# Patient Record
Sex: FEMALE | Race: WHITE | Employment: FULL TIME | URBAN - METROPOLITAN AREA
[De-identification: names, ages, dates, MRNs, and addresses within clinical notes are randomized per-mention and may not be internally consistent; named-entity substitution may affect disease eponyms.]

---

## 2021-01-29 ENCOUNTER — HOSPITAL ENCOUNTER (EMERGENCY)
Age: 61
Discharge: HOME | End: 2021-01-30
Attending: EMERGENCY MEDICINE
Payer: COMMERCIAL

## 2021-01-29 ENCOUNTER — APPOINTMENT (OUTPATIENT)
Dept: GENERAL RADIOLOGY | Age: 61
End: 2021-01-29
Attending: EMERGENCY MEDICINE
Payer: COMMERCIAL

## 2021-01-29 ENCOUNTER — APPOINTMENT (OUTPATIENT)
Dept: CT IMAGING | Age: 61
End: 2021-01-29
Attending: EMERGENCY MEDICINE
Payer: COMMERCIAL

## 2021-01-29 DIAGNOSIS — L03.116 CELLULITIS OF LEFT LOWER EXTREMITY: Primary | ICD-10-CM

## 2021-01-29 DIAGNOSIS — L02.416 CUTANEOUS ABSCESS OF LEFT LOWER EXTREMITY: ICD-10-CM

## 2021-01-29 LAB
ALBUMIN SERPL-MCNC: 3.1 G/DL (ref 3.4–5)
ALBUMIN/GLOB SERPL: 0.7 {RATIO} (ref 0.8–1.7)
ALP SERPL-CCNC: 77 U/L (ref 45–117)
ALT SERPL-CCNC: 22 U/L (ref 13–56)
ANION GAP SERPL CALC-SCNC: 4 MMOL/L (ref 3–18)
APPEARANCE UR: ABNORMAL
AST SERPL-CCNC: 12 U/L (ref 10–38)
BACTERIA URNS QL MICRO: ABNORMAL /HPF
BASOPHILS # BLD: 0 K/UL (ref 0–0.1)
BASOPHILS NFR BLD: 0 % (ref 0–3)
BILIRUB SERPL-MCNC: 0.4 MG/DL (ref 0.2–1)
BILIRUB UR QL: NEGATIVE
BUN SERPL-MCNC: 16 MG/DL (ref 7–18)
BUN/CREAT SERPL: 20 (ref 12–20)
CALCIUM SERPL-MCNC: 9.3 MG/DL (ref 8.5–10.1)
CHLORIDE SERPL-SCNC: 95 MMOL/L (ref 100–111)
CO2 SERPL-SCNC: 34 MMOL/L (ref 21–32)
COLOR UR: YELLOW
CREAT SERPL-MCNC: 0.82 MG/DL (ref 0.6–1.3)
DIFFERENTIAL METHOD BLD: ABNORMAL
EOSINOPHIL # BLD: 0 K/UL (ref 0–0.4)
EOSINOPHIL NFR BLD: 0 % (ref 0–5)
EPITH CASTS URNS QL MICRO: ABNORMAL /LPF (ref 0–5)
ERYTHROCYTE [DISTWIDTH] IN BLOOD BY AUTOMATED COUNT: 14.8 % (ref 11.6–14.5)
GLOBULIN SER CALC-MCNC: 4.2 G/DL (ref 2–4)
GLUCOSE SERPL-MCNC: 211 MG/DL (ref 74–99)
GLUCOSE UR STRIP.AUTO-MCNC: >1000 MG/DL
HCT VFR BLD AUTO: 39 % (ref 35–45)
HGB BLD-MCNC: 12.4 G/DL (ref 12–16)
HGB UR QL STRIP: NEGATIVE
KETONES UR QL STRIP.AUTO: 15 MG/DL
LACTATE BLD-SCNC: 1.6 MMOL/L (ref 0.4–2)
LEUKOCYTE ESTERASE UR QL STRIP.AUTO: ABNORMAL
LYMPHOCYTES # BLD: 0.9 K/UL (ref 0.8–3.5)
LYMPHOCYTES NFR BLD: 5 % (ref 20–51)
MCH RBC QN AUTO: 28.9 PG (ref 24–34)
MCHC RBC AUTO-ENTMCNC: 31.8 G/DL (ref 31–37)
MCV RBC AUTO: 90.9 FL (ref 74–97)
MONOCYTES # BLD: 2.3 K/UL (ref 0–1)
MONOCYTES NFR BLD: 13 % (ref 2–9)
NEUTS BAND NFR BLD MANUAL: 2 % (ref 0–5)
NEUTS SEG # BLD: 14.4 K/UL (ref 1.8–8)
NEUTS SEG NFR BLD: 80 % (ref 42–75)
NITRITE UR QL STRIP.AUTO: NEGATIVE
PH UR STRIP: 5 [PH] (ref 5–8)
PLATELET # BLD AUTO: 278 K/UL (ref 135–420)
PMV BLD AUTO: 9.7 FL (ref 9.2–11.8)
POTASSIUM SERPL-SCNC: 4 MMOL/L (ref 3.5–5.5)
PROT SERPL-MCNC: 7.3 G/DL (ref 6.4–8.2)
PROT UR STRIP-MCNC: 30 MG/DL
RBC # BLD AUTO: 4.29 M/UL (ref 4.2–5.3)
RBC #/AREA URNS HPF: ABNORMAL /HPF (ref 0–5)
RBC MORPH BLD: ABNORMAL
SODIUM SERPL-SCNC: 133 MMOL/L (ref 136–145)
SP GR UR REFRACTOMETRY: >1.03 (ref 1–1.03)
UROBILINOGEN UR QL STRIP.AUTO: 0.2 EU/DL (ref 0.2–1)
WBC # BLD AUTO: 18 K/UL (ref 4.6–13.2)
WBC URNS QL MICRO: ABNORMAL /HPF (ref 0–5)

## 2021-01-29 PROCEDURE — 74011250636 HC RX REV CODE- 250/636: Performed by: EMERGENCY MEDICINE

## 2021-01-29 PROCEDURE — 83605 ASSAY OF LACTIC ACID: CPT

## 2021-01-29 PROCEDURE — 96366 THER/PROPH/DIAG IV INF ADDON: CPT

## 2021-01-29 PROCEDURE — 80053 COMPREHEN METABOLIC PANEL: CPT

## 2021-01-29 PROCEDURE — 71045 X-RAY EXAM CHEST 1 VIEW: CPT

## 2021-01-29 PROCEDURE — 74177 CT ABD & PELVIS W/CONTRAST: CPT

## 2021-01-29 PROCEDURE — 87040 BLOOD CULTURE FOR BACTERIA: CPT

## 2021-01-29 PROCEDURE — 96367 TX/PROPH/DG ADDL SEQ IV INF: CPT

## 2021-01-29 PROCEDURE — 96365 THER/PROPH/DIAG IV INF INIT: CPT

## 2021-01-29 PROCEDURE — 96375 TX/PRO/DX INJ NEW DRUG ADDON: CPT

## 2021-01-29 PROCEDURE — 93005 ELECTROCARDIOGRAM TRACING: CPT

## 2021-01-29 PROCEDURE — 85025 COMPLETE CBC W/AUTO DIFF WBC: CPT

## 2021-01-29 PROCEDURE — 74011000258 HC RX REV CODE- 258: Performed by: EMERGENCY MEDICINE

## 2021-01-29 PROCEDURE — 99285 EMERGENCY DEPT VISIT HI MDM: CPT

## 2021-01-29 PROCEDURE — 81001 URINALYSIS AUTO W/SCOPE: CPT

## 2021-01-29 PROCEDURE — 74011000636 HC RX REV CODE- 636: Performed by: EMERGENCY MEDICINE

## 2021-01-29 RX ORDER — LOSARTAN POTASSIUM AND HYDROCHLOROTHIAZIDE 25; 100 MG/1; MG/1
1 TABLET ORAL DAILY
COMMUNITY

## 2021-01-29 RX ORDER — CLINDAMYCIN PHOSPHATE 600 MG/50ML
600 INJECTION, SOLUTION INTRAVENOUS EVERY 8 HOURS
Status: DISCONTINUED | OUTPATIENT
Start: 2021-01-29 | End: 2021-01-30 | Stop reason: HOSPADM

## 2021-01-29 RX ORDER — DOXYCYCLINE HYCLATE 100 MG
100 TABLET ORAL 2 TIMES DAILY
Qty: 14 TAB | Refills: 0 | Status: SHIPPED | OUTPATIENT
Start: 2021-01-29 | End: 2021-02-05

## 2021-01-29 RX ORDER — AMLODIPINE BESYLATE 5 MG/1
5 TABLET ORAL DAILY
COMMUNITY

## 2021-01-29 RX ORDER — AMOXICILLIN AND CLAVULANATE POTASSIUM 875; 125 MG/1; MG/1
1 TABLET, FILM COATED ORAL 2 TIMES DAILY
Qty: 20 TAB | Refills: 0 | Status: SHIPPED | OUTPATIENT
Start: 2021-01-29 | End: 2021-02-08

## 2021-01-29 RX ORDER — VANCOMYCIN 2 GRAM/500 ML IN 0.9 % SODIUM CHLORIDE INTRAVENOUS
2000 ONCE
Status: COMPLETED | OUTPATIENT
Start: 2021-01-29 | End: 2021-01-30

## 2021-01-29 RX ORDER — VANCOMYCIN 2 GRAM/500 ML IN 0.9 % SODIUM CHLORIDE INTRAVENOUS
2000 EVERY 12 HOURS
Status: DISCONTINUED | OUTPATIENT
Start: 2021-01-30 | End: 2021-01-30

## 2021-01-29 RX ORDER — SODIUM CHLORIDE 0.9 % (FLUSH) 0.9 %
5-10 SYRINGE (ML) INJECTION AS NEEDED
Status: DISCONTINUED | OUTPATIENT
Start: 2021-01-29 | End: 2021-01-30 | Stop reason: HOSPADM

## 2021-01-29 RX ORDER — METFORMIN HYDROCHLORIDE 500 MG/1
500 TABLET, FILM COATED, EXTENDED RELEASE ORAL
COMMUNITY

## 2021-01-29 RX ORDER — FENOFIBRATE 160 MG/1
160 TABLET ORAL DAILY
COMMUNITY

## 2021-01-29 RX ORDER — ATORVASTATIN CALCIUM 20 MG/1
20 TABLET, FILM COATED ORAL DAILY
COMMUNITY

## 2021-01-29 RX ADMIN — CLINDAMYCIN PHOSPHATE 600 MG: 600 INJECTION, SOLUTION INTRAVENOUS at 20:39

## 2021-01-29 RX ADMIN — VANCOMYCIN HYDROCHLORIDE 2000 MG: 10 INJECTION, POWDER, LYOPHILIZED, FOR SOLUTION INTRAVENOUS at 22:22

## 2021-01-29 RX ADMIN — IOPAMIDOL 100 ML: 612 INJECTION, SOLUTION INTRAVENOUS at 21:11

## 2021-01-29 RX ADMIN — PIPERACILLIN AND TAZOBACTAM 4.5 G: 4; .5 INJECTION, POWDER, LYOPHILIZED, FOR SOLUTION INTRAVENOUS; PARENTERAL at 20:57

## 2021-01-30 VITALS
BODY MASS INDEX: 45.99 KG/M2 | DIASTOLIC BLOOD PRESSURE: 44 MMHG | WEIGHT: 293 LBS | SYSTOLIC BLOOD PRESSURE: 134 MMHG | OXYGEN SATURATION: 92 % | RESPIRATION RATE: 18 BRPM | HEART RATE: 103 BPM | HEIGHT: 67 IN | TEMPERATURE: 98.7 F

## 2021-01-30 LAB
ATRIAL RATE: 90 BPM
CALCULATED P AXIS, ECG09: 41 DEGREES
CALCULATED R AXIS, ECG10: 17 DEGREES
CALCULATED T AXIS, ECG11: 22 DEGREES
DIAGNOSIS, 93000: NORMAL
P-R INTERVAL, ECG05: 146 MS
Q-T INTERVAL, ECG07: 348 MS
QRS DURATION, ECG06: 92 MS
QTC CALCULATION (BEZET), ECG08: 425 MS
VENTRICULAR RATE, ECG03: 90 BPM

## 2021-01-30 PROCEDURE — 96366 THER/PROPH/DIAG IV INF ADDON: CPT

## 2021-01-30 NOTE — PROGRESS NOTES
Vancomycin - Pharmacy to Dose    Consult provided for this 56y.o. year old ,female for indication of Skin and Soft Tissue Infection. Therapy day 1        Wt Readings from Last 1 Encounters:   01/29/21 157.4 kg (347 lb)       Ht Readings from Last 1 Encounters:   01/29/21 170.2 cm (67\")     Dosing Weight:157.4 kg     Additional Antibiotics:  Zosyn, Clindamycin    Date:  1/29/21   Lab Results   Component Value Date/Time    Creatinine 0.81 04/24/2015 06:00 PM     creatinine clearance:  Pending    white blood cell count:  Pending    Will dose initially with Vancomycin 2000 mg IV x 1     Further dosing based on S. Creat results. Dose calculated to approximate a therapeutic trough of 15-20 mcg/mL. Pharmacy to follow daily and will make changes to dose and/or frequency based on clinical status.     [address] [place] [address], [address] [place] [address]

## 2021-01-30 NOTE — PROGRESS NOTES
Vancomycin - Pharmacy to Dose ( Skin and Soft Tissue Infection )  CrCl >100 ml/min ( S. Creat 0.82 1/29/21 )  Will continue with Vancomycin 2000 mg IV q12hrs. BMP x 3 ordered. Pharmacy will continue to follow and adjust for goal trough of 15-20 mcg/ml. Jong Gibbs  [phone]

## 2021-01-30 NOTE — ED NOTES
Pt told where to   prescribed medications. Pt given verbal and written d/c instructions.  pt ambulated from ED in NAD

## 2021-01-30 NOTE — ED TRIAGE NOTES
Patient reports having wound to left upper left for several weeks. Patient reports the wound ruptured two days ago, leaking purulent drainage.  Patient was seen [facility] and advised to come to ED for further treatment

## 2021-01-30 NOTE — ED PROVIDER NOTES
EMERGENCY DEPARTMENT HISTORY AND PHYSICAL EXAM    Date: 1/29/2021  Patient Name: Jenna Nair    History of Presenting Illness     Chief Complaint   Patient presents with    Abscess         History Provided By: Patient    Additional History (Context):   Jenna Nair is a 56 y.o. female with PMHX diabetes presents to the emergency department via private vehicle C/O left-sided groin and thigh abscess that started draining yesterday. Patient reports that she noticed the wound, drainage and abscess 2 days ago. [facility] advised that the patient should come to the emergency department for further evaluation. States that at urgent care, her urinalysis was performed. No indication for UTI. Pt denies fever, nausea, vomiting, and any other sxs or complaints. No relieving or exacerbating factors identified. PCP: None    Current Facility-Administered Medications   Medication Dose Route Frequency Provider Last Rate Last Admin    sodium chloride (NS) flush 5-10 mL  5-10 mL IntraVENous PRN Michael Henriquez DO        clindamycin (CLEOCIN) 600mg NS 50 mL IVPB (premix)  600 mg IntraVENous Q8H Michael Henriquez DO   600 mg at 01/29/21 2039    piperacillin-tazobactam (ZOSYN) 4.5 g in 0.9% sodium chloride (MBP/ADV) 100 mL MBP  4.5 g IntraVENous Q6H Michael Henriquez  mL/hr at 01/29/21 2057 4.5 g at 01/29/21 2057    Vancomycin - Pharmacokinetic Dosing  1 Each Other Rx Dosing/Monitoring Michael Henriquez DO        vancomycin (VANCOCIN) 2000 mg in  ml infusion  2,000 mg IntraVENous ONCE Michael Henriquez DO        [START ON 1/30/2021] vancomycin (VANCOCIN) 2000 mg in  ml infusion  2,000 mg IntraVENous Q12H Michael Henriquez DO         Current Outpatient Medications   Medication Sig Dispense Refill    amLODIPine (NORVASC) 5 mg tablet Take 5 mg by mouth daily.       atorvastatin (LIPITOR) 20 mg tablet Take 20 mg by mouth daily.  dapagliflozin (Farxiga) 10 mg tab tablet Take 10 mg by mouth daily.  fenofibrate (LOFIBRA) 160 mg tablet Take 160 mg by mouth daily.  losartan-hydroCHLOROthiazide (HYZAAR) 100-25 mg per tablet Take 1 Tab by mouth daily.  metFORMIN (GLUMETZA ER) 500 mg TG24 24 hour tablet Take 500 mg by mouth.  icosapent ethyl (VASCEPA PO) Take 2 g by mouth two (2) times a day.  doxycycline (VIBRA-TABS) 100 mg tablet Take 1 Tab by mouth two (2) times a day for 7 days. 14 Tab 0    amoxicillin-clavulanate (Augmentin) 875-125 mg per tablet Take 1 Tab by mouth two (2) times a day for 10 days. 20 Tab 0    HYDROcodone-acetaminophen 5-500 mg cap Take  by mouth. Past History     Past Medical History:  Past Medical History:   Diagnosis Date    Diabetes ([facility]     Hypertension     Morbid obesity ([facility] [facility]        Past Surgical History:  Past Surgical History:   Procedure Laterality Date    HX GASTRIC BYPASS      HX HYSTERECTOMY      HX KNEE REPLACEMENT Bilateral     HX SHOULDER ARTHROSCOPY Right        Family History:  History reviewed. No pertinent family history. Social History:  Social History     Tobacco Use    Smoking status: Never Smoker    Smokeless tobacco: Never Used   Substance Use Topics    Alcohol use: No    Drug use: No       Allergies: Allergies   Allergen Reactions    Aspirin Other (comments)     Pt reports turning blue    Tetanus Vaccines And Toxoid Other (comments)     Pt reports near death experience as child         Review of Systems   Review of Systems   Constitutional: Negative for chills and fever. HENT: Negative for congestion, ear pain, sinus pain and sore throat. Eyes: Negative for pain and visual disturbance. Respiratory: Negative for cough and shortness of breath. Cardiovascular: Negative for chest pain and leg swelling. Gastrointestinal: Negative for abdominal pain, constipation, diarrhea, nausea and vomiting. Genitourinary: Negative for dysuria, hematuria, vaginal bleeding and vaginal discharge. Musculoskeletal: Negative for back pain and neck pain. Skin: Positive for wound. Negative for rash. Neurological: Negative for dizziness, tremors, weakness, light-headedness and numbness. All other systems reviewed and are negative. Physical Exam     Vitals:    01/29/21 1929 01/29/21 2117 01/29/21 2200   BP: (!) 152/63 (!) 155/59 (!) 144/48   Pulse: (!) 103     Resp: 18     Temp: 98.7 °F (37.1 °C)     SpO2: 99% 99% 94%   Weight: 157.4 kg (347 lb)     Height: 5' 7\" (1.702 m)       Physical Exam  Genitourinary:            Nursing note and vitals reviewed    Constitutional: Morbidly obese elderly  female, nontoxic  Head: Normocephalic, Atraumatic  Eyes: Pupils are equal, round, and reactive to light, EOMI  Neck: Supple, non-tender  Cardiovascular: Regular rate and rhythm, no murmurs, rubs, or gallops, + 2 radial pulses bilaterally  Chest: Normal work of breathing and chest excursion bilaterally  Lungs: Clear to ausculation bilaterally, no wheezes, no rhonchi  Abdomen: Soft, non tender, non distended, normoactive bowel sounds  : Extensive indurated erythema along the left groin extending along the perineum. There is a firm purulent wound that is approximately 2 cm in radius that is draining green purulence. Back: No evidence of trauma or deformity  Extremities: No evidence of trauma or deformity, no LE edema.  No streaking erythema, vesicular lesions, ulcerations or bulla  Skin: Warm and dry, normal cap refill  Neuro: Alert and appropriate, CN intact, normal speech, moving all 4 extremities freely and symmetrically  Psychiatric: Normal mood and affect       Diagnostic Study Results     Labs -     Recent Results (from the past 12 hour(s))   METABOLIC PANEL, COMPREHENSIVE    Collection Time: 01/29/21  8:25 PM   Result Value Ref Range    Sodium 133 (L) 136 - 145 mmol/L    Potassium 4.0 3.5 - 5.5 mmol/L Chloride 95 (L) 100 - 111 mmol/L    CO2 34 (H) 21 - 32 mmol/L    Anion gap 4 3.0 - 18 mmol/L    Glucose 211 (H) 74 - 99 mg/dL    BUN 16 7.0 - 18 MG/DL    Creatinine 0.82 0.6 - 1.3 MG/DL    BUN/Creatinine ratio 20 12 - 20      GFR est AA >60 >60 ml/min/1.73m2    GFR est non-AA >60 >60 ml/min/1.73m2    Calcium 9.3 8.5 - 10.1 MG/DL    Bilirubin, total 0.4 0.2 - 1.0 MG/DL    ALT (SGPT) 22 13 - 56 U/L    AST (SGOT) 12 10 - 38 U/L    Alk. phosphatase 77 45 - 117 U/L    Protein, total 7.3 6.4 - 8.2 g/dL    Albumin 3.1 (L) 3.4 - 5.0 g/dL    Globulin 4.2 (H) 2.0 - 4.0 g/dL    A-G Ratio 0.7 (L) 0.8 - 1.7     CBC WITH AUTOMATED DIFF    Collection Time: 01/29/21  8:25 PM   Result Value Ref Range    WBC 18.0 (H) 4.6 - 13.2 K/uL    RBC 4.29 4. 20 - 5.30 M/uL    HGB 12.4 12.0 - 16.0 g/dL    HCT 39.0 35.0 - 45.0 %    MCV 90.9 74.0 - 97.0 FL    MCH 28.9 24.0 - 34.0 PG    MCHC 31.8 31.0 - 37.0 g/dL    RDW 14.8 (H) 11.6 - 14.5 %    PLATELET 852 060 - 378 K/uL    MPV 9.7 9.2 - 11.8 FL    NEUTROPHILS 80 (H) 42 - 75 %    BAND NEUTROPHILS 2 0 - 5 %    LYMPHOCYTES 5 (L) 20 - 51 %    MONOCYTES 13 (H) 2 - 9 %    EOSINOPHILS 0 0 - 5 %    BASOPHILS 0 0 - 3 %    ABS. NEUTROPHILS 14.4 (H) 1.8 - 8.0 K/UL    ABS. LYMPHOCYTES 0.9 0.8 - 3.5 K/UL    ABS. MONOCYTES 2.3 (H) 0 - 1.0 K/UL    ABS. EOSINOPHILS 0.0 0.0 - 0.4 K/UL    ABS.  BASOPHILS 0.0 0.0 - 0.1 K/UL    RBC COMMENTS NORMOCYTIC, NORMOCHROMIC      DF MANUAL     POC LACTIC ACID    Collection Time: 01/29/21  8:35 PM   Result Value Ref Range    Lactic Acid (POC) 1.60 0.40 - 2.00 mmol/L   EKG, 12 LEAD, INITIAL    Collection Time: 01/29/21  8:46 PM   Result Value Ref Range    Ventricular Rate 90 BPM    Atrial Rate 90 BPM    P-R Interval 146 ms    QRS Duration 92 ms    Q-T Interval 348 ms    QTC Calculation (Bezet) 425 ms    Calculated P Axis 41 degrees    Calculated R Axis 17 degrees    Calculated T Axis 22 degrees    Diagnosis       Normal sinus rhythm  Possible Anterior infarct (cited on or before 29-JAN-2021)  Abnormal ECG  When compared with ECG of 24-APR-2015 17:16,  No significant change was found     URINALYSIS W/ RFLX MICROSCOPIC    Collection Time: 01/29/21  9:15 PM   Result Value Ref Range    Color YELLOW      Appearance CLOUDY      Specific gravity >1.030 (H) 1.005 - 1.030    pH (UA) 5.0 5.0 - 8.0      Protein 30 (A) NEG mg/dL    Glucose >1,000 (A) NEG mg/dL    Ketone 15 (A) NEG mg/dL    Bilirubin Negative NEG      Blood Negative NEG      Urobilinogen 0.2 0.2 - 1.0 EU/dL    Nitrites Negative NEG      Leukocyte Esterase TRACE (A) NEG     URINE MICROSCOPIC ONLY    Collection Time: 01/29/21  9:15 PM   Result Value Ref Range    WBC 2 to 4 0 - 5 /hpf    RBC 0 to 2 0 - 5 /hpf    Epithelial cells 2+ 0 - 5 /lpf    Bacteria 3+ (A) NEG /hpf       Radiologic Studies -   CT ABD PELV W CONT   Final Result      Subcutaneous inflammatory changes with an air-containing abscess is present   involving the upper medial left thigh/peritoneum. The air reaches the skin   surface compatible with the known site of drainage. No significant pocket of   fluid is present      Reactive bilateral inguinal lymph nodes appear present      XR CHEST PORT   Final Result      No active cardiopulmonary disease. CT Results  (Last 48 hours)               01/29/21 2142  CT ABD PELV W CONT Final result    Impression:      Subcutaneous inflammatory changes with an air-containing abscess is present   involving the upper medial left thigh/peritoneum. The air reaches the skin   surface compatible with the known site of drainage. No significant pocket of   fluid is present       Reactive bilateral inguinal lymph nodes appear present       Narrative:  EXAM: CT of the abdomen and pelvis       INDICATION: r/o Chidi's   -Additional: Purulent wound drainage       COMPARISON: None.        TECHNIQUE: Axial CT imaging of the abdomen and pelvis was performed after the   uneventful administration of 100 cc of Isovue-300 intravenous contrast. Multiplanar reformats were generated. One or more dose reduction techniques were   used on this CT: automated exposure control, adjustment of the mAs and/or kVp   according to patient size, and iterative reconstruction techniques. The   specific techniques used on this CT exam have been documented in the patient's   electronic medical record. Digital Imaging [employer] Communications in Medicine   (DICOM) format image data are available to nonaffiliated external healthcare   facilities or entities on a secure, media free, reciprocally searchable basis   with patient authorization for at least a 12-month period after this study. _______________       FINDINGS:       Lower Chest: Unremarkable. Liver, Biliary: The liver is of mild diminished density suggesting hepatic   steatosis. No biliary dilation. Gallbladder is unremarkable. Pancreas: Normal.       Spleen: Normal.       Adrenals: Normal.       Kidneys: Bilateral benign renal cysts are present. No hydronephrosis or   suspicious renal masses       Lymph Nodes: There is an enlarged left inguinal lymph node present measuring 3.8   x 1.8 cm, prior 3.1 x 1.7 cm. A 2nd enlarged left inguinal lymph node is present   measuring 3.9 x 1.9 cm. Additional smaller bilateral inguinal lymph nodes are   evident. These are likely reactive. Gastrointestinal Tract: Postoperative changes are seen involving the stomach. The appendix is normal       Pelvic Organs: Status post hysterectomy       Vasculature: Unremarkable. Bones: No acute or aggressive osseous abnormalities identified. Other: Subcutaneous inflammatory changes are seen involving the upper medial   left thigh. Soft tissue stranding is present. Bubbles of air are evident. No   significant fluid collection is present. The appearance is compatible with a   subcutaneous upper left thigh/perineal abscess.  The bubbles of air reached the   skin surface of the medial thigh consistent with the known site of purulent   drainage. _______________               CXR Results  (Last 48 hours)               01/29/21 2019  XR CHEST PORT Final result    Impression:      No active cardiopulmonary disease. Narrative:  EXAM: Portable Chest       CLINICAL INDICATION: meets SIRS criteria   -Additional: None       COMPARISON: 11/26/07       TECHNIQUE: AP portable view at 2003       ______________       FINDINGS:       HEART AND MEDIASTINUM: The heart size is normal.       LUNGS AND AIRWAYS: The lungs are clear. PLEURA: No pleural effusion or pneumothorax. BONES: No acute or aggressive osseous lesions. Chronic spondylosis is present. OTHER: None.       ______________                   Medical Decision Making   I am the first provider for this patient. I reviewed the vital signs, available nursing notes, past medical history, past surgical history, family history and social history. Vital Signs-Reviewed the patient's vital signs. Pulse Oximetry Analysis -99% on room air    Cardiac Monitor:  Rate: 103 bpm  Rhythm: Regular    EKG interpretation: (Preliminary)  7:53 PM   Normal sinus rhythm at 90 bpm.  NJ interval 146 ms. QRS 92 ms. QTc 425 ms. No acute ST elevation  Records Reviewed: Nursing Notes and Old Medical Records    Provider Notes:   56 y.o. female presenting with groin abscess. On exam, she is afebrile and nontoxic. She has a benign neck abdominal exam however extensive indurated erythema along the left groin extending along the perineum. There is a firm purulent wound that is approximately 2 cm in radius that is draining green purulence. Will initiate septic work-up. Will start IV antibiotics. Will obtain CT imaging to evaluate for deep space abscess and infection including Chidi's    Procedures:  Procedures    ED Course:   7:53 PM   Initial assessment performed.  The patients presenting problems have been discussed, and they are in agreement with the care plan formulated and outlined with them. I have encouraged them to ask questions as they arise throughout their visit. 10:18 PM  Patient with mild leukocytosis to 18,000.  2% bands. Left shift but lactic acid is within normal limits. In addition patient is afebrile and normotensive. UA likely contaminant, not consistent with a UTI. CT imaging is reassuring with subcutaneous inflammation with draining abscess and no significant pocket of fluid present. There is no indication for I&D with the already draining abscess and no deep fluid collection. CT imaging most consistent with overlying cellulitis. As the patient has not failed outpatient therapy with outpatient antibiotics, patient is appropriate for discharge with dual therapy with doxycycline and Augmentin. This was discussed with the patient who is in agreement with this plan. Diagnosis and Disposition       DISCHARGE NOTE:  10:18 PM    Rima Gordillo's  results have been reviewed with her. She has been counseled regarding her diagnosis, treatment, and plan. She verbally conveys understanding and agreement of the signs, symptoms, diagnosis, treatment and prognosis and additionally agrees to follow up as discussed. She also agrees with the care-plan and conveys that all of her questions have been answered. I have also provided discharge instructions for her that include: educational information regarding their diagnosis and treatment, and list of reasons why they would want to return to the ED prior to their follow-up appointment, should her condition change. She has been provided with education for proper emergency department utilization. CLINICAL IMPRESSION:    1. Cellulitis of left lower extremity    2. Cutaneous abscess of left lower extremity        PLAN:  1. D/C Home  2.    Current Discharge Medication List      START taking these medications    Details   doxycycline (VIBRA-TABS) 100 mg tablet Take 1 Tab by mouth two (2) times a day for 7 days. Qty: 14 Tab, Refills: 0      amoxicillin-clavulanate (Augmentin) 875-125 mg per tablet Take 1 Tab by mouth two (2) times a day for 10 days. Qty: 20 Tab, Refills: 0           3. Follow-up Information     Follow up With Specialties Details Why Contact Info    [address] [facility] [place]  Schedule an appointment as soon as possible for a visit in 2 days  [address] [facility], [address] [facility] [address]    [facility] Emergency Medicine  As needed if symptoms worsen [address] [facility] [address] [phone]        ____________________________________     Please note that this dictation was completed with [employer], the computer voice recognition software. Quite often unanticipated grammatical, syntax, homophones, and other interpretive errors are inadvertently transcribed by the computer software. Please disregard these errors. Please excuse any errors that have escaped final proofreading.

## 2021-02-04 LAB
BACTERIA SPEC CULT: NORMAL
BACTERIA SPEC CULT: NORMAL
SERVICE CMNT-IMP: NORMAL
SERVICE CMNT-IMP: NORMAL